# Patient Record
Sex: FEMALE | URBAN - METROPOLITAN AREA
[De-identification: names, ages, dates, MRNs, and addresses within clinical notes are randomized per-mention and may not be internally consistent; named-entity substitution may affect disease eponyms.]

---

## 2020-09-14 ENCOUNTER — APPOINTMENT (RX ONLY)
Dept: URBAN - METROPOLITAN AREA CLINIC 83 | Facility: CLINIC | Age: 55
Setting detail: DERMATOLOGY
End: 2020-09-14

## 2020-09-14 DIAGNOSIS — D22 MELANOCYTIC NEVI: ICD-10-CM

## 2020-09-14 DIAGNOSIS — L65.0 TELOGEN EFFLUVIUM: ICD-10-CM

## 2020-09-14 PROBLEM — D22.9 MELANOCYTIC NEVI, UNSPECIFIED: Status: ACTIVE | Noted: 2020-09-14

## 2020-09-14 PROCEDURE — 99203 OFFICE O/P NEW LOW 30 MIN: CPT

## 2020-09-14 PROCEDURE — ? COUNSELING

## 2020-09-14 PROCEDURE — ? PRESCRIPTION

## 2020-09-14 RX ORDER — CLOBETASOL PROPIONATE 0.5 MG/ML
FEW SOLUTION TOPICAL HS
Qty: 50 | Refills: 5 | Status: ERX | COMMUNITY
Start: 2020-09-14

## 2020-09-14 RX ADMIN — CLOBETASOL PROPIONATE FEW: 0.5 SOLUTION TOPICAL at 00:00

## 2020-12-15 ENCOUNTER — APPOINTMENT (RX ONLY)
Dept: URBAN - METROPOLITAN AREA CLINIC 83 | Facility: CLINIC | Age: 55
Setting detail: DERMATOLOGY
End: 2020-12-15

## 2020-12-15 DIAGNOSIS — L66.81 CENTRAL CENTRIFUGAL CICATRICIAL ALOPECIA: ICD-10-CM

## 2020-12-15 DIAGNOSIS — L66.8 OTHER CICATRICIAL ALOPECIA: ICD-10-CM

## 2020-12-15 DIAGNOSIS — L65.0 TELOGEN EFFLUVIUM: ICD-10-CM | Status: IMPROVED

## 2020-12-15 PROCEDURE — 99213 OFFICE O/P EST LOW 20 MIN: CPT

## 2020-12-15 PROCEDURE — ? COUNSELING

## 2020-12-15 PROCEDURE — ? TREATMENT REGIMEN

## 2021-03-16 ENCOUNTER — APPOINTMENT (RX ONLY)
Dept: URBAN - METROPOLITAN AREA CLINIC 83 | Facility: CLINIC | Age: 56
Setting detail: DERMATOLOGY
End: 2021-03-16

## 2021-03-16 DIAGNOSIS — L65.0 TELOGEN EFFLUVIUM: ICD-10-CM | Status: IMPROVED

## 2021-03-16 DIAGNOSIS — L21.8 OTHER SEBORRHEIC DERMATITIS: ICD-10-CM

## 2021-03-16 DIAGNOSIS — L64.8 OTHER ANDROGENIC ALOPECIA: ICD-10-CM

## 2021-03-16 DIAGNOSIS — L08.0 PYODERMA: ICD-10-CM

## 2021-03-16 PROCEDURE — ? PRESCRIPTION

## 2021-03-16 PROCEDURE — ? TREATMENT REGIMEN

## 2021-03-16 PROCEDURE — 99213 OFFICE O/P EST LOW 20 MIN: CPT

## 2021-03-16 RX ORDER — DOXYCYCLINE HYCLATE 100 MG/1
ONE CAPSULE, GELATIN COATED ORAL BID
Qty: 20 | Refills: 0 | Status: ERX | COMMUNITY
Start: 2021-03-16

## 2021-03-16 RX ADMIN — DOXYCYCLINE HYCLATE ONE: 100 CAPSULE, GELATIN COATED ORAL at 00:00

## 2021-03-16 NOTE — PROCEDURE: TREATMENT REGIMEN
Detail Level: Simple
Continue Regimen: Clobetasol sol
Continue Regimen: Clobetasol sol hs
Initiate Treatment: 5% minoxidil to eyebrows hs

## 2021-10-29 ENCOUNTER — APPOINTMENT (RX ONLY)
Dept: URBAN - METROPOLITAN AREA CLINIC 83 | Facility: CLINIC | Age: 56
Setting detail: DERMATOLOGY
End: 2021-10-29

## 2021-10-29 DIAGNOSIS — L29.8 OTHER PRURITUS: ICD-10-CM

## 2021-10-29 DIAGNOSIS — L65.0 TELOGEN EFFLUVIUM: ICD-10-CM

## 2021-10-29 DIAGNOSIS — L29.89 OTHER PRURITUS: ICD-10-CM

## 2021-10-29 DIAGNOSIS — L66.8 OTHER CICATRICIAL ALOPECIA: ICD-10-CM

## 2021-10-29 DIAGNOSIS — L66.81 CENTRAL CENTRIFUGAL CICATRICIAL ALOPECIA: ICD-10-CM

## 2021-10-29 PROCEDURE — 99213 OFFICE O/P EST LOW 20 MIN: CPT | Mod: 25

## 2021-10-29 PROCEDURE — ? TREATMENT REGIMEN

## 2021-10-29 PROCEDURE — ? COUNSELING

## 2021-10-29 PROCEDURE — 11901 INJECT SKIN LESIONS >7: CPT

## 2021-10-29 PROCEDURE — ? INTRALESIONAL KENALOG

## 2021-10-29 ASSESSMENT — LOCATION DETAILED DESCRIPTION DERM
LOCATION DETAILED: LEFT SUPERIOR MEDIAL FOREHEAD
LOCATION DETAILED: LEFT SUPERIOR PARIETAL SCALP
LOCATION DETAILED: RIGHT SUPERIOR FOREHEAD
LOCATION DETAILED: LEFT FOREHEAD
LOCATION DETAILED: SUPERIOR MID FOREHEAD
LOCATION DETAILED: LEFT SUPERIOR FOREHEAD
LOCATION DETAILED: RIGHT MEDIAL FRONTAL SCALP
LOCATION DETAILED: RIGHT SUPERIOR PARIETAL SCALP

## 2021-10-29 ASSESSMENT — LOCATION ZONE DERM
LOCATION ZONE: SCALP
LOCATION ZONE: FACE

## 2021-10-29 ASSESSMENT — LOCATION SIMPLE DESCRIPTION DERM
LOCATION SIMPLE: RIGHT FOREHEAD
LOCATION SIMPLE: SCALP
LOCATION SIMPLE: SUPERIOR FOREHEAD
LOCATION SIMPLE: LEFT FOREHEAD
LOCATION SIMPLE: RIGHT SCALP

## 2021-10-29 NOTE — PROCEDURE: INTRALESIONAL KENALOG
Concentration Of Solution Injected (Mg/Ml): 10.0
X Size Of Lesion In Cm (Optional): 0
Validate Note Data When Using Inventory: Yes
Detail Level: Detailed
Consent: The risks of atrophy were reviewed with the patient.
Include Z78.9 (Other Specified Conditions Influencing Health Status) As An Associated Diagnosis?: No
Medical Necessity Clause: This procedure was medically necessary because the lesions that were treated were:
Total Volume Injected (Ccs- Only Use Numbers And Decimals): 1
Kenalog Preparation: Kenalog

## 2022-04-29 ENCOUNTER — APPOINTMENT (RX ONLY)
Dept: URBAN - METROPOLITAN AREA CLINIC 83 | Facility: CLINIC | Age: 57
Setting detail: DERMATOLOGY
End: 2022-04-29

## 2022-04-29 DIAGNOSIS — L29.8 OTHER PRURITUS: ICD-10-CM

## 2022-04-29 DIAGNOSIS — L29.89 OTHER PRURITUS: ICD-10-CM

## 2022-04-29 DIAGNOSIS — L66.8 OTHER CICATRICIAL ALOPECIA: ICD-10-CM

## 2022-04-29 DIAGNOSIS — L66.81 CENTRAL CENTRIFUGAL CICATRICIAL ALOPECIA: ICD-10-CM

## 2022-04-29 PROCEDURE — ? TREATMENT REGIMEN

## 2022-04-29 PROCEDURE — 11901 INJECT SKIN LESIONS >7: CPT

## 2022-04-29 PROCEDURE — 99213 OFFICE O/P EST LOW 20 MIN: CPT | Mod: 25

## 2022-04-29 PROCEDURE — ? INTRALESIONAL KENALOG

## 2022-04-29 ASSESSMENT — LOCATION ZONE DERM
LOCATION ZONE: SCALP
LOCATION ZONE: FACE

## 2022-04-29 ASSESSMENT — LOCATION DETAILED DESCRIPTION DERM
LOCATION DETAILED: RIGHT SUPERIOR PARIETAL SCALP
LOCATION DETAILED: LEFT SUPERIOR LATERAL FOREHEAD
LOCATION DETAILED: LEFT LATERAL FOREHEAD
LOCATION DETAILED: LEFT SUPERIOR PARIETAL SCALP
LOCATION DETAILED: RIGHT LATERAL FOREHEAD
LOCATION DETAILED: POSTERIOR MID-PARIETAL SCALP
LOCATION DETAILED: RIGHT SUPERIOR LATERAL FOREHEAD

## 2022-04-29 ASSESSMENT — LOCATION SIMPLE DESCRIPTION DERM
LOCATION SIMPLE: SCALP
LOCATION SIMPLE: POSTERIOR SCALP
LOCATION SIMPLE: RIGHT FOREHEAD
LOCATION SIMPLE: LEFT FOREHEAD

## 2022-04-29 NOTE — PROCEDURE: INTRALESIONAL KENALOG
X Size Of Lesion In Cm (Optional): 0
Medical Necessity Clause: This procedure was medically necessary because the lesions that were treated were:
Concentration Of Solution Injected (Mg/Ml): 10.0
Total Volume Injected (Ccs- Only Use Numbers And Decimals): 1
Detail Level: Detailed
Include Z78.9 (Other Specified Conditions Influencing Health Status) As An Associated Diagnosis?: No
Validate Note Data When Using Inventory: Yes
Kenalog Preparation: Kenalog
Consent: The risks of atrophy were reviewed with the patient.

## 2022-10-28 ENCOUNTER — APPOINTMENT (RX ONLY)
Dept: URBAN - METROPOLITAN AREA CLINIC 80 | Facility: CLINIC | Age: 57
Setting detail: DERMATOLOGY
End: 2022-10-28

## 2022-10-28 DIAGNOSIS — L29.89 OTHER PRURITUS: ICD-10-CM

## 2022-10-28 DIAGNOSIS — L66.8 OTHER CICATRICIAL ALOPECIA: ICD-10-CM | Status: WELL CONTROLLED

## 2022-10-28 DIAGNOSIS — L29.8 OTHER PRURITUS: ICD-10-CM

## 2022-10-28 DIAGNOSIS — L66.81 CENTRAL CENTRIFUGAL CICATRICIAL ALOPECIA: ICD-10-CM | Status: WELL CONTROLLED

## 2022-10-28 PROCEDURE — 99213 OFFICE O/P EST LOW 20 MIN: CPT | Mod: 25

## 2022-10-28 PROCEDURE — ? INTRALESIONAL KENALOG

## 2022-10-28 PROCEDURE — ? PRESCRIPTION MEDICATION MANAGEMENT

## 2022-10-28 PROCEDURE — ? PRESCRIPTION

## 2022-10-28 PROCEDURE — ? COUNSELING

## 2022-10-28 PROCEDURE — 11901 INJECT SKIN LESIONS >7: CPT

## 2022-10-28 PROCEDURE — ? TREATMENT REGIMEN

## 2022-10-28 RX ORDER — CLOBETASOL PROPIONATE 0.5 MG/ML
SOLUTION TOPICAL
Qty: 50 | Refills: 5 | Status: ERX | COMMUNITY
Start: 2022-10-28

## 2022-10-28 RX ADMIN — CLOBETASOL PROPIONATE: 0.5 SOLUTION TOPICAL at 00:00

## 2022-10-28 ASSESSMENT — LOCATION DETAILED DESCRIPTION DERM
LOCATION DETAILED: LEFT MEDIAL FRONTAL SCALP
LOCATION DETAILED: RIGHT CENTRAL FRONTAL SCALP
LOCATION DETAILED: RIGHT MEDIAL FRONTAL SCALP
LOCATION DETAILED: LEFT MEDIAL FRONTAL SCALP
LOCATION DETAILED: RIGHT SUPERIOR FOREHEAD
LOCATION DETAILED: LEFT CENTRAL FRONTAL SCALP
LOCATION DETAILED: LEFT SUPERIOR FOREHEAD

## 2022-10-28 ASSESSMENT — LOCATION ZONE DERM
LOCATION ZONE: SCALP
LOCATION ZONE: SCALP
LOCATION ZONE: FACE

## 2022-10-28 ASSESSMENT — LOCATION SIMPLE DESCRIPTION DERM
LOCATION SIMPLE: LEFT SCALP
LOCATION SIMPLE: LEFT FOREHEAD
LOCATION SIMPLE: LEFT SCALP
LOCATION SIMPLE: RIGHT FOREHEAD
LOCATION SIMPLE: RIGHT SCALP

## 2022-10-28 NOTE — PROCEDURE: INTRALESIONAL KENALOG
Include Z78.9 (Other Specified Conditions Influencing Health Status) As An Associated Diagnosis?: No
Kenalog Preparation: Kenalog
Total Volume Injected (Ccs- Only Use Numbers And Decimals): 0.02
How Many Mls Were Removed From The 40 Mg/Ml (10ml) Vial When Preparing The Injectable Solution?: 0
Medical Necessity Clause: This procedure was medically necessary because the lesions that were treated were:
Ndc# For Kenalog Only: [id number]
Concentration Of Solution Injected (Mg/Ml): 1.0
Validate Note Data When Using Inventory: Yes
Detail Level: Detailed
Lot # (Optional): [id number]
Expiration Date (Optional): 10/23
Consent: The risks of atrophy were reviewed with the patient.
Administered By (Optional): Concepcion

## 2022-10-28 NOTE — PROCEDURE: PRESCRIPTION MEDICATION MANAGEMENT
Detail Level: Zone
Render In Strict Bullet Format?: No
Continue Regimen: clobetasol 0.05 % scalp solution \\nQuantity: 50.0 ml\\nSig: Apply to scalp once daily at night ,as needed\\nOTC Rogaine
Plan: Continue clobetasol and Monoxide 5%

## 2023-06-06 NOTE — HPI: HAIR LOSS
----- Message from So Holguin MD sent at 6/6/2023  2:32 PM EDT -----  Please let mom know swab came back negative for herpes virus, so this is not an HSV cold sore. Can continue the mupirocin ointment. thanks  
Called and spoke with Mom   
How Did The Hair Loss Occur?: gradual in onset
How Severe Is Your Hair Loss?: mild
Additional History: Patient started getting hair loss after Doctor changed her medication aubajo.